# Patient Record
Sex: MALE | Race: WHITE | Employment: FULL TIME | URBAN - METROPOLITAN AREA
[De-identification: names, ages, dates, MRNs, and addresses within clinical notes are randomized per-mention and may not be internally consistent; named-entity substitution may affect disease eponyms.]

---

## 2022-06-18 ENCOUNTER — OFFICE VISIT (OUTPATIENT)
Dept: URGENT CARE | Age: 47
End: 2022-06-18
Payer: COMMERCIAL

## 2022-06-18 VITALS
HEIGHT: 72 IN | RESPIRATION RATE: 16 BRPM | HEART RATE: 69 BPM | TEMPERATURE: 97.9 F | WEIGHT: 230 LBS | OXYGEN SATURATION: 98 % | DIASTOLIC BLOOD PRESSURE: 94 MMHG | BODY MASS INDEX: 31.15 KG/M2 | SYSTOLIC BLOOD PRESSURE: 160 MMHG

## 2022-06-18 DIAGNOSIS — I10 PRIMARY HYPERTENSION: Primary | ICD-10-CM

## 2022-06-18 PROCEDURE — 99203 OFFICE O/P NEW LOW 30 MIN: CPT | Performed by: NURSE PRACTITIONER

## 2022-06-18 RX ORDER — LISINOPRIL 20 MG/1
20 TABLET ORAL DAILY
COMMUNITY
End: 2022-06-18 | Stop reason: SDUPTHER

## 2022-06-18 RX ORDER — LISINOPRIL 20 MG/1
20 TABLET ORAL DAILY
Qty: 14 TABLET | Refills: 0 | Status: SHIPPED | OUTPATIENT
Start: 2022-06-18

## 2022-06-18 NOTE — PROGRESS NOTES
HISTORY OF PRESENT ILLNESS  Sneha Berg is a 51 y.o. male. Patient presents with:  Medication Refill: Pt here today for BP medication refill. Pt taking Lisinopril 20mg daily. On this dose for about 1 year. Working well for him. Here from out of town. Ran out of medication about 4 days ago. Hypertension  This is a new problem. The current episode started more than 2 days ago (Here from [facility] for job over next 1 month. seen by MD before leaving with plan for med refill and NOT sent as planned). The problem occurs constantly. The problem has not changed since onset. Pertinent negatives include no chest pain, no abdominal pain, no headaches and no shortness of breath. Nothing aggravates the symptoms. Nothing relieves the symptoms. He has tried nothing for the symptoms. There is no problem list on file for this patient. There are no problems to display for this patient. Current Outpatient Medications   Medication Sig Dispense Refill    dulaglutide (TRULICITY SC) by SubCUTAneous route.  lisinopriL (PRINIVIL, ZESTRIL) 20 mg tablet Take 1 Tablet by mouth daily. Indications: high blood pressure 14 Tablet 0     No Known Allergies  Past Medical History:   Diagnosis Date    Endocrine disease     Type 2 diabetes    Hypertension      Past Surgical History:   Procedure Laterality Date    HX ORTHOPAEDIC      shoulder surgery     History reviewed. No pertinent family history. Social History     Tobacco Use    Smoking status: Never Smoker    Smokeless tobacco: Never Used   Substance Use Topics    Alcohol use: Not on file          Review of Systems   Constitutional: Negative for chills, fever and malaise/fatigue. HENT: Negative for congestion and sore throat. Respiratory: Negative for cough and shortness of breath. Cardiovascular: Negative for chest pain. Gastrointestinal: Negative for abdominal pain, diarrhea, nausea and vomiting. Musculoskeletal: Negative for myalgias.    Neurological: Negative for headaches. All other systems reviewed and are negative. Physical Exam  Vitals and nursing note reviewed. Constitutional:       General: He is not in acute distress. Appearance: He is well-developed. He is not diaphoretic. HENT:      Head: Normocephalic and atraumatic. Right Ear: External ear normal.      Left Ear: External ear normal.      Nose: Nose normal.      Mouth/Throat:      Pharynx: No oropharyngeal exudate. Eyes:      Pupils: Pupils are equal, round, and reactive to light. Cardiovascular:      Rate and Rhythm: Normal rate and regular rhythm. Heart sounds: Normal heart sounds. No murmur heard. Pulmonary:      Effort: Pulmonary effort is normal. No respiratory distress. Breath sounds: Normal breath sounds. No wheezing. Abdominal:      General: Bowel sounds are normal. There is no distension. Palpations: Abdomen is soft. Tenderness: There is no abdominal tenderness. Musculoskeletal:         General: No tenderness. Normal range of motion. Cervical back: Normal range of motion and neck supple. Lymphadenopathy:      Cervical: No cervical adenopathy. Skin:     General: Skin is warm and dry. Findings: No erythema or rash. Neurological:      Mental Status: He is alert and oriented to person, place, and time. Psychiatric:         Behavior: Behavior normal.         Thought Content: Thought content normal.         Judgment: Judgment normal.         ASSESSMENT and PLAN  CLINICAL IMPRESSION:     ICD-10-CM ICD-9-CM    1. Primary hypertension  I10 401.9 lisinopriL (PRINIVIL, ZESTRIL) 20 mg tablet         Plan:  F/U with PCP/Specialty in 1-2 days  Orders Placed This Encounter    DISCONTD: lisinopriL (PRINIVIL, ZESTRIL) 20 mg tablet     Sig: Take 20 mg by mouth daily.  dulaglutide (TRULICITY SC)     Sig: by SubCUTAneous route.  lisinopriL (PRINIVIL, ZESTRIL) 20 mg tablet     Sig: Take 1 Tablet by mouth daily.  Indications: high blood pressure     Dispense:  14 Tablet     Refill:  0           The patients condition was discussed with the patient and they understand. The patient is to follow up with PCP. If signs and symptoms become worse the pt is to go to the ER. The patient is to take medications as prescribed.

## 2022-06-18 NOTE — PATIENT INSTRUCTIONS
